# Patient Record
Sex: FEMALE | Race: WHITE | NOT HISPANIC OR LATINO
[De-identification: names, ages, dates, MRNs, and addresses within clinical notes are randomized per-mention and may not be internally consistent; named-entity substitution may affect disease eponyms.]

---

## 2019-08-27 ENCOUNTER — APPOINTMENT (OUTPATIENT)
Dept: INTERNAL MEDICINE | Facility: CLINIC | Age: 34
End: 2019-08-27
Payer: OTHER GOVERNMENT

## 2019-08-27 VITALS
BODY MASS INDEX: 28.34 KG/M2 | DIASTOLIC BLOOD PRESSURE: 80 MMHG | WEIGHT: 135 LBS | OXYGEN SATURATION: 96 % | SYSTOLIC BLOOD PRESSURE: 120 MMHG | HEART RATE: 77 BPM | HEIGHT: 58 IN | TEMPERATURE: 98.8 F

## 2019-08-27 DIAGNOSIS — N92.6 IRREGULAR MENSTRUATION, UNSPECIFIED: ICD-10-CM

## 2019-08-27 DIAGNOSIS — Z78.9 OTHER SPECIFIED HEALTH STATUS: ICD-10-CM

## 2019-08-27 DIAGNOSIS — Z86.59 PERSONAL HISTORY OF OTHER MENTAL AND BEHAVIORAL DISORDERS: ICD-10-CM

## 2019-08-27 DIAGNOSIS — Z02.82 ENCOUNTER FOR ADOPTION SERVICES: ICD-10-CM

## 2019-08-27 DIAGNOSIS — Z13.31 ENCOUNTER FOR SCREENING FOR DEPRESSION: ICD-10-CM

## 2019-08-27 LAB
BASOPHILS # BLD AUTO: 0.05 K/UL
BASOPHILS NFR BLD AUTO: 0.7 %
EOSINOPHIL # BLD AUTO: 0.11 K/UL
EOSINOPHIL NFR BLD AUTO: 1.6 %
HCT VFR BLD CALC: 44.7 %
HGB BLD-MCNC: 14.9 G/DL
IMM GRANULOCYTES NFR BLD AUTO: 0.1 %
LYMPHOCYTES # BLD AUTO: 2.42 K/UL
LYMPHOCYTES NFR BLD AUTO: 34.7 %
MAN DIFF?: NORMAL
MCHC RBC-ENTMCNC: 31 PG
MCHC RBC-ENTMCNC: 33.3 GM/DL
MCV RBC AUTO: 93.1 FL
MONOCYTES # BLD AUTO: 0.41 K/UL
MONOCYTES NFR BLD AUTO: 5.9 %
NEUTROPHILS # BLD AUTO: 3.97 K/UL
NEUTROPHILS NFR BLD AUTO: 57 %
PLATELET # BLD AUTO: 348 K/UL
RBC # BLD: 4.8 M/UL
RBC # FLD: 11.3 %
WBC # FLD AUTO: 6.97 K/UL

## 2019-08-27 PROCEDURE — 99385 PREV VISIT NEW AGE 18-39: CPT | Mod: 25

## 2019-08-27 PROCEDURE — G0444 DEPRESSION SCREEN ANNUAL: CPT | Mod: 59

## 2019-08-27 RX ORDER — IRON/IRON ASP GLY/FA/MV-MIN 38 125-25-1MG
TABLET ORAL
Refills: 0 | Status: ACTIVE | COMMUNITY

## 2019-08-27 RX ORDER — OMEGA-3/DHA/EPA/FISH OIL 300-1000MG
CAPSULE ORAL
Refills: 0 | Status: ACTIVE | COMMUNITY

## 2019-08-27 RX ORDER — MULTIVITAMIN
TABLET ORAL
Refills: 0 | Status: ACTIVE | COMMUNITY

## 2019-08-27 NOTE — PHYSICAL EXAM
[Well Nourished] : well nourished [No Acute Distress] : no acute distress [Well Developed] : well developed [Well-Appearing] : well-appearing [Normal Sclera/Conjunctiva] : normal sclera/conjunctiva [PERRL] : pupils equal round and reactive to light [Normal Oropharynx] : the oropharynx was normal [Normal Outer Ear/Nose] : the outer ears and nose were normal in appearance [Supple] : supple [No Respiratory Distress] : no respiratory distress  [Clear to Auscultation] : lungs were clear to auscultation bilaterally [No Accessory Muscle Use] : no accessory muscle use [Normal Rate] : normal rate  [Regular Rhythm] : with a regular rhythm [No Edema] : there was no peripheral edema [Normal S1, S2] : normal S1 and S2 [Soft] : abdomen soft [Non Tender] : non-tender [Non-distended] : non-distended [Normal Bowel Sounds] : normal bowel sounds [Normal Anterior Cervical Nodes] : no anterior cervical lymphadenopathy [No CVA Tenderness] : no CVA  tenderness [Coordination Grossly Intact] : coordination grossly intact [Grossly Normal Strength/Tone] : grossly normal strength/tone [No Focal Deficits] : no focal deficits [Normal Gait] : normal gait [Normal Affect] : the affect was normal [Alert and Oriented x3] : oriented to person, place, and time [Normal Insight/Judgement] : insight and judgment were intact

## 2019-08-27 NOTE — PLAN
[FreeTextEntry1] : Healthcare Maintenance\par -routine labs, unable to obtain in office she will go to [facility] lab to have these drawn\par -check STD, HIV screening as well\par -depression screen negative\par -UTD with pap smear 2018- normal results per patient\par \par Irregular periods\par -check pelvic and transvaginal US\par -GYN referral provided\par -will follow up with her husbands urologist about procedure for fertility and where she needs to schedule visit\par

## 2019-08-27 NOTE — HISTORY OF PRESENT ILLNESS
[FreeTextEntry1] : establish care, irregular periods [de-identified] : 35yo F presents to establish care, she complains of irregular periods over the past year. She has always had regular menstrual cycles until ealier this year she noted that they change in duration each time. She has had cycles that last 1 day, 3 days and then sometimes a whole week. They are heavier and more painful than prior as well. Aside from her menstrual cycle she denies any pelvic pain or vaginal spotting. Her  recently underwent sperm retrieval and they plan to undergo fertility treatments pending the results follow up with his urologist. She was last seen by a GYN summer of last year for routine pap smear at a clinic but does need to establish care with a local GYN.\par Denies any other complaints.\par She has been diagnosed with rheumatoid arthritis in the past, had a joint swelling flare up at one point and went to rheumatologist for testing. Since then she is not on any medications and denies any joint pain or swelling.\par She has also been diagnosed with bipolar II disorder in the past, she underwent extensive therapy for this but has not required treatment for years. She feels well and stable overall, on no medications.\par She has had hearing loss bilaterally since childhood, been to audiologist before and had hearing aids at one point but does not want to use them at this time. She feels well with how she is hearing currently and would like to hold off on using assistive devices at this time.

## 2019-08-27 NOTE — HEALTH RISK ASSESSMENT
[Yes] : Yes [No] : In the past 12 months have you used drugs other than those required for medical reasons? No [] :  [Smoke Detector] : smoke detector [Carbon Monoxide Detector] : carbon monoxide detector [Seat Belt] :  uses seat belt [1 or 2 (0 pts)] : 1 or 2 (0 points) [Monthly or less (1 pt)] : Monthly or less (1 point) [Never (0 pts)] : Never (0 points) [0] : 1) Little interest or pleasure doing things: Not at all (0) [Patient reported PAP Smear was normal] : Patient reported PAP Smear was normal [Hepatitis C test offered] : Hepatitis C test offered [HIV Test offered] : HIV Test offered [With Family] : lives with family [None] : None [# Of Children ___] : has [unfilled] children [Sexually Active] : sexually active [Fully functional (bathing, dressing, toileting, transferring, walking, feeding)] : Fully functional (bathing, dressing, toileting, transferring, walking, feeding) [Feels Safe at Home] : Feels safe at home [Fully functional (using the telephone, shopping, preparing meals, housekeeping, doing laundry, using] : Fully functional and needs no help or supervision to perform IADLs (using the telephone, shopping, preparing meals, housekeeping, doing laundry, using transportation, managing medications and managing finances) [Reports changes in hearing] : Reports changes in hearing [] : No [Audit-CScore] : 1 [[id number]] : 0 [Change in mental status noted] : No change in mental status noted [Language] : denies difficulty with language [Reports changes in vision] : Reports no changes in vision [Reports changes in dental health] : Reports no changes in dental health [PapSmearDate] : 07/18 [de-identified] : h/o hearing loss [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

## 2019-08-28 LAB
25(OH)D3 SERPL-MCNC: 21.1 NG/ML
ALBUMIN SERPL ELPH-MCNC: 5 G/DL
ALP BLD-CCNC: 77 U/L
ALT SERPL-CCNC: 36 U/L
ANION GAP SERPL CALC-SCNC: 17 MMOL/L
AST SERPL-CCNC: 21 U/L
BILIRUB SERPL-MCNC: 0.2 MG/DL
BUN SERPL-MCNC: 14 MG/DL
CALCIUM SERPL-MCNC: 10.2 MG/DL
CHLORIDE SERPL-SCNC: 99 MMOL/L
CHOLEST SERPL-MCNC: 372 MG/DL
CHOLEST/HDLC SERPL: 9.1 RATIO
CO2 SERPL-SCNC: 25 MMOL/L
CREAT SERPL-MCNC: 0.63 MG/DL
ESTIMATED AVERAGE GLUCOSE: 97 MG/DL
GLUCOSE SERPL-MCNC: 99 MG/DL
HAV IGM SER QL: NONREACTIVE
HBA1C MFR BLD HPLC: 5 %
HBV CORE IGM SER QL: NONREACTIVE
HBV SURFACE AG SER QL: NONREACTIVE
HCV AB SER QL: NONREACTIVE
HCV S/CO RATIO: 0.09 S/CO
HDLC SERPL-MCNC: 41 MG/DL
HIV1+2 AB SPEC QL IA.RAPID: NONREACTIVE
LDLC SERPL CALC-MCNC: 263 MG/DL
POTASSIUM SERPL-SCNC: 3.8 MMOL/L
PROT SERPL-MCNC: 7.9 G/DL
SODIUM SERPL-SCNC: 141 MMOL/L
T PALLIDUM AB SER QL IA: NEGATIVE
TRIGL SERPL-MCNC: 340 MG/DL
TSH SERPL-ACNC: 2.42 UIU/ML

## 2019-08-29 LAB
C TRACH RRNA SPEC QL NAA+PROBE: NOT DETECTED
N GONORRHOEA RRNA SPEC QL NAA+PROBE: NOT DETECTED
SOURCE AMPLIFICATION: NORMAL

## 2019-09-10 ENCOUNTER — TRANSCRIPTION ENCOUNTER (OUTPATIENT)
Age: 34
End: 2019-09-10

## 2019-09-22 ENCOUNTER — FORM ENCOUNTER (OUTPATIENT)
Age: 34
End: 2019-09-22

## 2019-09-23 ENCOUNTER — OUTPATIENT (OUTPATIENT)
Dept: OUTPATIENT SERVICES | Facility: HOSPITAL | Age: 34
LOS: 1 days | End: 2019-09-23
Payer: OTHER GOVERNMENT

## 2019-09-23 ENCOUNTER — APPOINTMENT (OUTPATIENT)
Dept: ULTRASOUND IMAGING | Facility: IMAGING CENTER | Age: 34
End: 2019-09-23
Payer: OTHER GOVERNMENT

## 2019-09-23 DIAGNOSIS — N92.6 IRREGULAR MENSTRUATION, UNSPECIFIED: ICD-10-CM

## 2019-09-23 PROCEDURE — 76856 US EXAM PELVIC COMPLETE: CPT | Mod: 26

## 2019-09-23 PROCEDURE — 76830 TRANSVAGINAL US NON-OB: CPT

## 2019-09-23 PROCEDURE — 76856 US EXAM PELVIC COMPLETE: CPT

## 2019-09-23 PROCEDURE — 76830 TRANSVAGINAL US NON-OB: CPT | Mod: 26

## 2021-03-16 ENCOUNTER — APPOINTMENT (OUTPATIENT)
Dept: OPHTHALMOLOGY | Facility: CLINIC | Age: 36
End: 2021-03-16

## 2021-10-01 ENCOUNTER — NON-APPOINTMENT (OUTPATIENT)
Age: 36
End: 2021-10-01

## 2021-10-01 ENCOUNTER — APPOINTMENT (OUTPATIENT)
Dept: INTERNAL MEDICINE | Facility: CLINIC | Age: 36
End: 2021-10-01
Payer: OTHER GOVERNMENT

## 2021-10-01 VITALS
HEIGHT: 58 IN | DIASTOLIC BLOOD PRESSURE: 85 MMHG | HEART RATE: 62 BPM | SYSTOLIC BLOOD PRESSURE: 136 MMHG | WEIGHT: 120 LBS | TEMPERATURE: 98.2 F | OXYGEN SATURATION: 98 % | BODY MASS INDEX: 25.19 KG/M2

## 2021-10-01 VITALS — DIASTOLIC BLOOD PRESSURE: 88 MMHG | SYSTOLIC BLOOD PRESSURE: 138 MMHG

## 2021-10-01 DIAGNOSIS — H91.90 UNSPECIFIED HEARING LOSS, UNSPECIFIED EAR: ICD-10-CM

## 2021-10-01 DIAGNOSIS — R05 COUGH: ICD-10-CM

## 2021-10-01 DIAGNOSIS — R80.9 PROTEINURIA, UNSPECIFIED: ICD-10-CM

## 2021-10-01 DIAGNOSIS — Z23 ENCOUNTER FOR IMMUNIZATION: ICD-10-CM

## 2021-10-01 DIAGNOSIS — M06.9 RHEUMATOID ARTHRITIS, UNSPECIFIED: ICD-10-CM

## 2021-10-01 DIAGNOSIS — U07.1 COVID-19: ICD-10-CM

## 2021-10-01 DIAGNOSIS — R10.9 UNSPECIFIED ABDOMINAL PAIN: ICD-10-CM

## 2021-10-01 DIAGNOSIS — Z00.00 ENCOUNTER FOR GENERAL ADULT MEDICAL EXAMINATION W/OUT ABNORMAL FINDINGS: ICD-10-CM

## 2021-10-01 DIAGNOSIS — K90.0 CELIAC DISEASE: ICD-10-CM

## 2021-10-01 PROCEDURE — 90471 IMMUNIZATION ADMIN: CPT

## 2021-10-01 PROCEDURE — 93000 ELECTROCARDIOGRAM COMPLETE: CPT

## 2021-10-01 PROCEDURE — 90715 TDAP VACCINE 7 YRS/> IM: CPT

## 2021-10-01 PROCEDURE — 36415 COLL VENOUS BLD VENIPUNCTURE: CPT

## 2021-10-01 PROCEDURE — 99395 PREV VISIT EST AGE 18-39: CPT | Mod: 25

## 2021-10-01 RX ORDER — FOLIC ACID 1 MG/1
1 TABLET ORAL
Refills: 0 | Status: DISCONTINUED | COMMUNITY
End: 2021-10-01

## 2021-10-01 RX ORDER — CRANBERRY FRUIT EXTRACT 200 MG
CAPSULE ORAL
Refills: 0 | Status: DISCONTINUED | COMMUNITY
End: 2021-10-01

## 2021-10-05 LAB
25(OH)D3 SERPL-MCNC: 56.8 NG/ML
ALBUMIN SERPL ELPH-MCNC: 4.9 G/DL
ALP BLD-CCNC: 68 U/L
ALT SERPL-CCNC: 19 U/L
ANION GAP SERPL CALC-SCNC: 13 MMOL/L
APPEARANCE: ABNORMAL
AST SERPL-CCNC: 17 U/L
BACTERIA: NEGATIVE
BASOPHILS # BLD AUTO: 0.05 K/UL
BASOPHILS NFR BLD AUTO: 0.8 %
BILIRUB SERPL-MCNC: 0.5 MG/DL
BILIRUBIN URINE: NEGATIVE
BLOOD URINE: NEGATIVE
BUN SERPL-MCNC: 11 MG/DL
CALCIUM SERPL-MCNC: 9.9 MG/DL
CHLORIDE SERPL-SCNC: 106 MMOL/L
CHOLEST SERPL-MCNC: 389 MG/DL
CO2 SERPL-SCNC: 22 MMOL/L
COLOR: YELLOW
CREAT SERPL-MCNC: 0.54 MG/DL
EOSINOPHIL # BLD AUTO: 0.07 K/UL
EOSINOPHIL NFR BLD AUTO: 1.2 %
ESTIMATED AVERAGE GLUCOSE: 103 MG/DL
GLUCOSE QUALITATIVE U: NEGATIVE
GLUCOSE SERPL-MCNC: 93 MG/DL
HBA1C MFR BLD HPLC: 5.2 %
HCT VFR BLD CALC: 41.5 %
HDLC SERPL-MCNC: 48 MG/DL
HGB BLD-MCNC: 14.1 G/DL
HYALINE CASTS: 1 /LPF
IMM GRANULOCYTES NFR BLD AUTO: 0.3 %
KETONES URINE: NEGATIVE
LDLC SERPL CALC-MCNC: 313 MG/DL
LEUKOCYTE ESTERASE URINE: NEGATIVE
LYMPHOCYTES # BLD AUTO: 1.9 K/UL
LYMPHOCYTES NFR BLD AUTO: 32.1 %
MAN DIFF?: NORMAL
MCHC RBC-ENTMCNC: 31.6 PG
MCHC RBC-ENTMCNC: 34 GM/DL
MCV RBC AUTO: 93 FL
MICROSCOPIC-UA: NORMAL
MONOCYTES # BLD AUTO: 0.31 K/UL
MONOCYTES NFR BLD AUTO: 5.2 %
NEUTROPHILS # BLD AUTO: 3.56 K/UL
NEUTROPHILS NFR BLD AUTO: 60.4 %
NITRITE URINE: NEGATIVE
NONHDLC SERPL-MCNC: 342 MG/DL
PH URINE: 5.5
PLATELET # BLD AUTO: 364 K/UL
POTASSIUM SERPL-SCNC: 4.2 MMOL/L
PROT SERPL-MCNC: 7.8 G/DL
PROTEIN URINE: ABNORMAL
RBC # BLD: 4.46 M/UL
RBC # FLD: 11.3 %
RED BLOOD CELLS URINE: 3 /HPF
SODIUM SERPL-SCNC: 141 MMOL/L
SPECIFIC GRAVITY URINE: 1.04
SQUAMOUS EPITHELIAL CELLS: 8 /HPF
TRIGL SERPL-MCNC: 144 MG/DL
TSH SERPL-ACNC: 2.35 UIU/ML
URIC ACID CRYSTALS: ABNORMAL
UROBILINOGEN URINE: NORMAL
WBC # FLD AUTO: 5.91 K/UL
WHITE BLOOD CELLS URINE: 3 /HPF

## 2021-11-19 ENCOUNTER — NON-APPOINTMENT (OUTPATIENT)
Age: 36
End: 2021-11-19

## 2021-11-19 ENCOUNTER — APPOINTMENT (OUTPATIENT)
Dept: ULTRASOUND IMAGING | Facility: CLINIC | Age: 36
End: 2021-11-19
Payer: OTHER GOVERNMENT

## 2021-11-19 ENCOUNTER — APPOINTMENT (OUTPATIENT)
Dept: CARDIOLOGY | Facility: CLINIC | Age: 36
End: 2021-11-19
Payer: OTHER GOVERNMENT

## 2021-11-19 ENCOUNTER — APPOINTMENT (OUTPATIENT)
Dept: CARDIOLOGY | Facility: CLINIC | Age: 36
End: 2021-11-19

## 2021-11-19 VITALS — SYSTOLIC BLOOD PRESSURE: 144 MMHG | DIASTOLIC BLOOD PRESSURE: 80 MMHG | HEART RATE: 66 BPM

## 2021-11-19 VITALS
SYSTOLIC BLOOD PRESSURE: 148 MMHG | RESPIRATION RATE: 17 BRPM | HEART RATE: 63 BPM | DIASTOLIC BLOOD PRESSURE: 91 MMHG | HEIGHT: 58 IN | BODY MASS INDEX: 25.61 KG/M2 | OXYGEN SATURATION: 100 % | WEIGHT: 122 LBS

## 2021-11-19 PROCEDURE — 76775 US EXAM ABDO BACK WALL LIM: CPT

## 2021-11-19 PROCEDURE — 36415 COLL VENOUS BLD VENIPUNCTURE: CPT

## 2021-11-19 PROCEDURE — 99244 OFF/OP CNSLTJ NEW/EST MOD 40: CPT

## 2021-11-19 PROCEDURE — 93000 ELECTROCARDIOGRAM COMPLETE: CPT

## 2021-11-22 ENCOUNTER — APPOINTMENT (OUTPATIENT)
Dept: CARDIOLOGY | Facility: CLINIC | Age: 36
End: 2021-11-22
Payer: OTHER GOVERNMENT

## 2021-11-22 LAB
CHOLEST SERPL-MCNC: 253 MG/DL
CK SERPL-CCNC: 83 U/L
CRP SERPL HS-MCNC: 0.29 MG/L
HCYS SERPL-MCNC: 8.2 UMOL/L
HDL-C: 56 MG/DL
HDL-P (TOTAL): 33.8 UMOL/L
HDLC SERPL-MCNC: 56 MG/DL
LDL SIZE: 21.5 NM
LDL-C: 163 MG/DL
LDL-P: 2329 NMOL/L
LDLC SERPL CALC-MCNC: 160 MG/DL
LP-IR SCORE: 66
NMR CHOLESTEROL, TOTAL: 254 MG/DL
NONHDLC SERPL-MCNC: 197 MG/DL
SMALL LDL-P: 958 NMOL/L
TRIGL SERPL-MCNC: 186 MG/DL
TRIGL SERPL-MCNC: 191 MG/DL

## 2021-11-22 PROCEDURE — 93015 CV STRESS TEST SUPVJ I&R: CPT

## 2021-11-22 PROCEDURE — 99212 OFFICE O/P EST SF 10 MIN: CPT | Mod: 25

## 2021-11-22 NOTE — PHYSICAL EXAM
[Normal] : well developed, well nourished, no acute distress [Well Developed] : well developed [Well Nourished] : well nourished [No Acute Distress] : no acute distress [Normal Conjunctiva] : normal conjunctiva [Normal Gait] : normal gait [No Skin Lesions] : no skin lesions [Moves all extremities] : moves all extremities [No Focal Deficits] : no focal deficits [Normal Speech] : normal speech [Alert and Oriented] : alert and oriented [Normal memory] : normal memory

## 2021-11-23 NOTE — REASON FOR VISIT
[FreeTextEntry1] : 36-year-old premenopausal woman with atypical chest pain but with presumably severe familial hyperlipidemia with LDL over 300.

## 2021-11-23 NOTE — REVIEW OF SYSTEMS
[Dyspnea on exertion] : dyspnea during exertion [Chest Discomfort] : chest discomfort [Syncope] : syncope [Negative] : Heme/Lymph [Weight Gain (___ Lbs)] : no recent weight gain [Feeling Fatigued] : not feeling fatigued [Weight Loss (___ Lbs)] : no recent weight loss [Lower Ext Edema] : no extremity edema [Palpitations] : no palpitations [Orthopnea] : no orthopnea [PND] : no PND [FreeTextEntry5] : see HPI [FreeTextEntry7] : Celiac disease [de-identified] : Bipolar disorder and borderline personality disorder both under good control without medication

## 2021-11-23 NOTE — CARDIOLOGY SUMMARY
[de-identified] : November 22, 2021.  Patient exercised for 10 minutes and 35 seconds to a heart rate of 168 and a blood pressure of 162/80.  She started out with mild chest discomfort on the right side which increased to 5 out of 10 during exercise and persisted for 5 minutes recovery.  She also have briefly had left-sided chest pain that was sharp and pinching-like during the exercise and then in recovery she said she felt lightheaded.  Blood pressure in recovery was fine.  Absolutely no ST changes and no arrhythmias.

## 2021-11-23 NOTE — HISTORY OF PRESENT ILLNESS
[FreeTextEntry1] : November 19, 2021.  Patient has no known cardiac history.  She thinks she may have had a murmur when she was young but was never sent to a pediatric cardiologist and she was able to compete in track and field without any issues.  She has a lipid profile from 2019 that had a cholesterol of 372, , triglycerides 372, and HDL of 41.  She has been mostly a vegan since then.  Unfortunately there is no known family history as she was adopted.  She does not smoke other than a few cigarettes years ago when she was younger.  No known diabetes or hypertension.  On September 29 she awoke with severe pressure across her chest in the middle of the night more right-sided than left and going a little bit to the right flank.  It lasted about an hour and then resolved.  She did not describe any associated features or radiation and that exact symptom has not recurred.  However she has had exertional shortness of breath which she dates back to August when she and her  had Covid.  She was treated with monoclonal antibodies and seemed to do okay. (Not yet vaccinated because she had to wait 90 days from the monoclonal antibodies and is scheduled to get her first vaccine next week.)  However she says she gets chest pressure on and off ever since the episode and maybe even before. It can last all day and is different than the severe episode that night.  At first she said there was no difference with exertion but by the end she felt she was more likely to have chest pressure and shortness of breath with exertion.  She had a recent repeat lipid profile and now her cholesterol was 389 with an LDL of 313 again despite being on a vegan diet.  Her recent hemoglobin A1c was 5.2.  She has a long history of manic depression and borderline personality disorder but after intensive therapy these have been under good control and she is on no medications for them.  She has had a couple of syncopal episodes that sound like either related to exhaustion and dehydration or possibly vasovagal.  As mentioned she is still premenopausal.  Her second pregnancy was complicated by low blood pressure but she has never had preeclampsia or hypertension related to her pregnancy.  She does get lightheaded if she has not eaten in a while.  Her baseline EKG is sinus bradycardia at 55 and otherwise totally normal.\par 11/22/2021.  Patient returns for brief follow-up along with her stress test.  Her cholesterol profile had already improved significantly on just 10 mg of rosuvastatin with a cholesterol of 253, HDL 56, .  The cardiac C-reactive protein was negative at 0.29.  Homocystine was normal at 8.2.  Her NMR lipid profile however was very abnormal with definitely small dense lipoproteins at 958 which is very high and her insulin resistance was very high at 66 although the average LDL size of 21.5 is favorable.  On the stress test she was able to exercise for 10-1/2 minutes to a heart rate of 168.  She started out with some resting atypical right-sided chest pressure that was 3 out of 10 and increased to 5 out of 10.  She developed some left-sided sharp pinching pain during the test and then in recovery felt lightheaded.  (She had mentioned that over the weekend she went running and felt lightheaded and had the chest pain and had to stop).  There were absolutely no ST changes whatsoever during the test or in recovery and no APCs or VPCs.

## 2022-01-25 ENCOUNTER — APPOINTMENT (OUTPATIENT)
Dept: CARDIOLOGY | Facility: CLINIC | Age: 37
End: 2022-01-25
Payer: OTHER GOVERNMENT

## 2022-01-25 VITALS
OXYGEN SATURATION: 97 % | WEIGHT: 126 LBS | HEIGHT: 58 IN | DIASTOLIC BLOOD PRESSURE: 68 MMHG | SYSTOLIC BLOOD PRESSURE: 119 MMHG | HEART RATE: 70 BPM | BODY MASS INDEX: 26.45 KG/M2

## 2022-01-25 DIAGNOSIS — R07.9 CHEST PAIN, UNSPECIFIED: ICD-10-CM

## 2022-01-25 PROCEDURE — 36415 COLL VENOUS BLD VENIPUNCTURE: CPT

## 2022-01-25 PROCEDURE — 99214 OFFICE O/P EST MOD 30 MIN: CPT

## 2022-01-27 LAB
ALBUMIN SERPL ELPH-MCNC: 5.2 G/DL
ALP BLD-CCNC: 61 U/L
ALT SERPL-CCNC: 27 U/L
ANION GAP SERPL CALC-SCNC: 16 MMOL/L
AST SERPL-CCNC: 21 U/L
BASOPHILS # BLD AUTO: 0.04 K/UL
BASOPHILS NFR BLD AUTO: 0.5 %
BILIRUB SERPL-MCNC: 0.5 MG/DL
BUN SERPL-MCNC: 10 MG/DL
CALCIUM SERPL-MCNC: 10.1 MG/DL
CHLORIDE SERPL-SCNC: 104 MMOL/L
CHOLEST SERPL-MCNC: 219 MG/DL
CK SERPL-CCNC: 81 U/L
CO2 SERPL-SCNC: 18 MMOL/L
CREAT SERPL-MCNC: 0.56 MG/DL
EOSINOPHIL # BLD AUTO: 0.03 K/UL
EOSINOPHIL NFR BLD AUTO: 0.4 %
GLUCOSE SERPL-MCNC: 76 MG/DL
HCT VFR BLD CALC: 42.3 %
HDLC SERPL-MCNC: 57 MG/DL
HGB BLD-MCNC: 14.3 G/DL
IMM GRANULOCYTES NFR BLD AUTO: 0.1 %
LDLC SERPL CALC-MCNC: 129 MG/DL
LYMPHOCYTES # BLD AUTO: 2.38 K/UL
LYMPHOCYTES NFR BLD AUTO: 32.7 %
MAN DIFF?: NORMAL
MCHC RBC-ENTMCNC: 31.4 PG
MCHC RBC-ENTMCNC: 33.8 GM/DL
MCV RBC AUTO: 93 FL
MONOCYTES # BLD AUTO: 0.3 K/UL
MONOCYTES NFR BLD AUTO: 4.1 %
NEUTROPHILS # BLD AUTO: 4.52 K/UL
NEUTROPHILS NFR BLD AUTO: 62.2 %
NONHDLC SERPL-MCNC: 162 MG/DL
PLATELET # BLD AUTO: 308 K/UL
POTASSIUM SERPL-SCNC: 4.1 MMOL/L
PROT SERPL-MCNC: 7.6 G/DL
RBC # BLD: 4.55 M/UL
RBC # FLD: 11.5 %
SODIUM SERPL-SCNC: 139 MMOL/L
TRIGL SERPL-MCNC: 163 MG/DL
WBC # FLD AUTO: 7.28 K/UL

## 2022-01-28 ENCOUNTER — NON-APPOINTMENT (OUTPATIENT)
Age: 37
End: 2022-01-28

## 2022-01-31 ENCOUNTER — APPOINTMENT (OUTPATIENT)
Dept: CARDIOLOGY | Facility: CLINIC | Age: 37
End: 2022-01-31

## 2022-01-31 ENCOUNTER — APPOINTMENT (OUTPATIENT)
Dept: CARDIOLOGY | Facility: CLINIC | Age: 37
End: 2022-01-31
Payer: OTHER GOVERNMENT

## 2022-01-31 PROCEDURE — 93306 TTE W/DOPPLER COMPLETE: CPT

## 2022-02-01 NOTE — REVIEW OF SYSTEMS
[Weight Gain (___ Lbs)] : [unfilled] ~Ulb weight gain [Dyspnea on exertion] : dyspnea during exertion [Chest Discomfort] : chest discomfort [Syncope] : syncope [Negative] : Heme/Lymph [Feeling Fatigued] : not feeling fatigued [Weight Loss (___ Lbs)] : no recent weight loss [Lower Ext Edema] : no extremity edema [Palpitations] : no palpitations [Orthopnea] : no orthopnea [PND] : no PND [FreeTextEntry5] : see HPI [FreeTextEntry7] : Celiac disease [de-identified] : Bipolar disorder and borderline personality disorder both under good control without medication

## 2022-02-01 NOTE — HISTORY OF PRESENT ILLNESS
Hide Include Location In Plan Question?: No [FreeTextEntry1] : November 19, 2021.  Patient has no known cardiac history.  She thinks she may have had a murmur when she was young but was never sent to a pediatric cardiologist and she was able to compete in track and field without any issues.  She has a lipid profile from 2019 that had a cholesterol of 372, , triglycerides 372, and HDL of 41.  She has been mostly a vegan since then.  Unfortunately there is no known family history as she was adopted.  She does not smoke other than a few cigarettes years ago when she was younger.  No known diabetes or hypertension.  On September 29 she awoke with severe pressure across her chest in the middle of the night more right-sided than left and going a little bit to the right flank.  It lasted about an hour and then resolved.  She did not describe any associated features or radiation and that exact symptom has not recurred.  However she has had exertional shortness of breath which she dates back to August when she and her  had Covid.  She was treated with monoclonal antibodies and seemed to do okay. (Not yet vaccinated because she had to wait 90 days from the monoclonal antibodies and is scheduled to get her first vaccine next week.)  However she says she gets chest pressure on and off ever since the episode and maybe even before. It can last all day and is different than the severe episode that night.  At first she said there was no difference with exertion but by the end she felt she was more likely to have chest pressure and shortness of breath with exertion.  She had a recent repeat lipid profile and now her cholesterol was 389 with an LDL of 313 again despite being on a vegan diet.  Her recent hemoglobin A1c was 5.2.  She has a long history of manic depression and borderline personality disorder but after intensive therapy these have been under good control and she is on no medications for them.  She has had a couple of syncopal episodes that sound like either related to exhaustion and dehydration or possibly vasovagal.  As mentioned she is still premenopausal.  Her second pregnancy was complicated by low blood pressure but she has never had preeclampsia or hypertension related to her pregnancy.  She does get lightheaded if she has not eaten in a while.  Her baseline EKG is sinus bradycardia at 55 and otherwise totally normal.\par 11/22/2021.  Patient returns for brief follow-up along with her stress test.  Her cholesterol profile had already improved significantly on just 10 mg of rosuvastatin with a cholesterol of 253, HDL 56, .  The cardiac C-reactive protein was negative at 0.29.  Homocystine was normal at 8.2.  Her NMR lipid profile however was very abnormal with definitely small dense lipoproteins at 958 which is very high and her insulin resistance was very high at 66 although the average LDL size of 21.5 is favorable.  On the stress test she was able to exercise for 10-1/2 minutes to a heart rate of 168.  She started out with some resting atypical right-sided chest pressure that was 3 out of 10 and increased to 5 out of 10.  She developed some left-sided sharp pinching pain during the test and then in recovery felt lightheaded.  (She had mentioned that over the weekend she went running and felt lightheaded and had the chest pain and had to stop).  There were absolutely no ST changes whatsoever during the test or in recovery and no APCs or VPCs.\par 1/25/2022.  Patient here in follow-up.  Now on rosuvastatin 20 mg.  Still has multiple symptoms none of which I think are related to the rosuvastatin.  Her  mentioned that she forgets a lot of things but I explained that this could not be from the statin such a short period of time.  Blood pressure excellent and improved.  Exam unremarkable.\par January 28, 2022. was here for echo today so came to talk about her with me.  He wanted to know if she would be diagnosed with angina and whether she needs an echocardiogram.  Still having chest pain and is exhausted which she thinks is ever since the rosuvastatin was increased.  I did explain that to have angina even with normal coronaries 1 has to first demonstrate ischemia on stress testing which she has not yet but we will schedule an echo and follow-up. \par  January 31, 2022.Patient came for echocardiogram.  Mild degree of MVP of posterior leaflet with mild MR.  Normal aortic valve.  Normal left atrium.  Normal LV size and function with LVEF 73%.  Normal RV size and function with mild TR.  RVSP 33.  Normal pericardium with no effusion.  Spoke with patient.  Reassured her that the degree of mitral regurgitation now is not causing any symptoms or problems and might not in the future but we will follow the echo every few years. \par \par  Detail Level: Zone

## 2022-02-01 NOTE — CARDIOLOGY SUMMARY
[de-identified] : November 22, 2021.  Patient exercised for 10 minutes and 35 seconds to a heart rate of 168 and a blood pressure of 162/80.  She started out with mild chest discomfort on the right side which increased to 5 out of 10 during exercise and persisted for 5 minutes recovery.  She also have briefly had left-sided chest pain that was sharp and pinching-like during the exercise and then in recovery she said she felt lightheaded.  Blood pressure in recovery was fine.  Absolutely no ST changes and no arrhythmias. [de-identified] : January 31, 2022.  Mild mitral valve prolapse involving posterior leaflet with mild MR.  Normal trileaflet aortic valve with normal opening and no AI.  Aorta 3.7 cm at sinus of Valsalva, ascending aorta 2.9 cm.  Normal left atrium.  Normal LV size and function with LVEF 73%.  Normal diastolic function.  Normal RV size and function with mild TR.  RVSP 33.  Normal pericardium with no effusion.

## 2022-02-01 NOTE — PHYSICAL EXAM
[Normal] : well developed, well nourished, no acute distress [Well Developed] : well developed [Well Nourished] : well nourished [No Acute Distress] : no acute distress [Normal Conjunctiva] : normal conjunctiva [Normal Venous Pressure] : normal venous pressure [No Carotid Bruit] : no carotid bruit [Normal S1, S2] : normal S1, S2 [No Murmur] : no murmur [No Rub] : no rub [No Gallop] : no gallop [Clear Lung Fields] : clear lung fields [Good Air Entry] : good air entry [No Respiratory Distress] : no respiratory distress  [Soft] : abdomen soft [Non Tender] : non-tender [No Masses/organomegaly] : no masses/organomegaly [Normal Bowel Sounds] : normal bowel sounds [Normal Gait] : normal gait [No Edema] : no edema [No Cyanosis] : no cyanosis [No Clubbing] : no clubbing [No Varicosities] : no varicosities [Normal Radial B/L] : normal radial B/L [Normal PT B/L] : normal PT B/L [Normal DP B/L] : normal DP B/L [No Rash] : no rash [No Skin Lesions] : no skin lesions [Moves all extremities] : moves all extremities [No Focal Deficits] : no focal deficits [Normal Speech] : normal speech [Alert and Oriented] : alert and oriented [Normal memory] : normal memory [de-identified] : No gallops rubs murmurs or clicks even in the left lateral decubitus position [de-identified] : No pulse deficits and no blood pressure differential in both arms [de-identified] : Multiple tattoos both arms [de-identified] : Not overly anxious, not manic not depressed

## 2022-04-12 ENCOUNTER — APPOINTMENT (OUTPATIENT)
Dept: PEDIATRIC MEDICAL GENETICS | Facility: CLINIC | Age: 37
End: 2022-04-12

## 2022-04-12 PROCEDURE — ZZZZZ: CPT

## 2022-06-07 ENCOUNTER — APPOINTMENT (OUTPATIENT)
Dept: PEDIATRIC MEDICAL GENETICS | Facility: CLINIC | Age: 37
End: 2022-06-07

## 2022-06-21 ENCOUNTER — NON-APPOINTMENT (OUTPATIENT)
Age: 37
End: 2022-06-21

## 2022-06-21 ENCOUNTER — APPOINTMENT (OUTPATIENT)
Dept: PEDIATRIC MEDICAL GENETICS | Facility: CLINIC | Age: 37
End: 2022-06-21

## 2022-06-21 PROCEDURE — 96040: CPT | Mod: 95

## 2022-08-09 ENCOUNTER — APPOINTMENT (OUTPATIENT)
Dept: CARDIOLOGY | Facility: CLINIC | Age: 37
End: 2022-08-09

## 2022-08-09 ENCOUNTER — NON-APPOINTMENT (OUTPATIENT)
Age: 37
End: 2022-08-09

## 2022-08-09 VITALS
DIASTOLIC BLOOD PRESSURE: 96 MMHG | HEART RATE: 72 BPM | RESPIRATION RATE: 17 BRPM | WEIGHT: 128 LBS | OXYGEN SATURATION: 98 % | BODY MASS INDEX: 26.87 KG/M2 | HEIGHT: 58 IN | SYSTOLIC BLOOD PRESSURE: 148 MMHG

## 2022-08-09 VITALS — HEART RATE: 70 BPM | SYSTOLIC BLOOD PRESSURE: 142 MMHG | DIASTOLIC BLOOD PRESSURE: 90 MMHG

## 2022-08-09 PROCEDURE — 93000 ELECTROCARDIOGRAM COMPLETE: CPT

## 2022-08-09 PROCEDURE — 36415 COLL VENOUS BLD VENIPUNCTURE: CPT

## 2022-08-09 PROCEDURE — 99214 OFFICE O/P EST MOD 30 MIN: CPT

## 2022-08-09 NOTE — REVIEW OF SYSTEMS
[Weight Gain (___ Lbs)] : [unfilled] ~Ulb weight gain [Feeling Fatigued] : not feeling fatigued [Weight Loss (___ Lbs)] : no recent weight loss [Dyspnea on exertion] : not dyspnea during exertion [Chest Discomfort] : no chest discomfort [Lower Ext Edema] : no extremity edema [Palpitations] : no palpitations [Orthopnea] : no orthopnea [PND] : no PND [Syncope] : no syncope [Negative] : Heme/Lymph [FreeTextEntry5] : see HPI [FreeTextEntry7] : Celiac disease [de-identified] : Bipolar disorder and borderline personality disorder both under good control without medication

## 2022-08-09 NOTE — CARDIOLOGY SUMMARY
[de-identified] : November 22, 2021.  Patient exercised for 10 minutes and 35 seconds to a heart rate of 168 and a blood pressure of 162/80.  She started out with mild chest discomfort on the right side which increased to 5 out of 10 during exercise and persisted for 5 minutes recovery.  She also have briefly had left-sided chest pain that was sharp and pinching-like during the exercise and then in recovery she said she felt lightheaded.  Blood pressure in recovery was fine.  Absolutely no ST changes and no arrhythmias. [de-identified] : January 31, 2022.  Mild mitral valve prolapse involving posterior leaflet with mild MR.  Normal trileaflet aortic valve with normal opening and no AI.  Aorta 3.7 cm at sinus of Valsalva, ascending aorta 2.9 cm.  Normal left atrium.  Normal LV size and function with LVEF 73%.  Normal diastolic function.  Normal RV size and function with mild TR.  RVSP 33.  Normal pericardium with no effusion.

## 2022-08-09 NOTE — REASON FOR VISIT
[FreeTextEntry1] : 37-year-old premenopausal woman with atypical chest pain but with presumably severe familial hyperlipidemia with LDL over 300.

## 2022-08-09 NOTE — PHYSICAL EXAM
[Normal] : well developed, well nourished, no acute distress [Well Developed] : well developed [Well Nourished] : well nourished [No Acute Distress] : no acute distress [Normal Conjunctiva] : normal conjunctiva [Normal Venous Pressure] : normal venous pressure [No Carotid Bruit] : no carotid bruit [Normal S1, S2] : normal S1, S2 [No Murmur] : no murmur [No Rub] : no rub [No Gallop] : no gallop [Clear Lung Fields] : clear lung fields [Good Air Entry] : good air entry [No Respiratory Distress] : no respiratory distress  [Soft] : abdomen soft [Non Tender] : non-tender [No Masses/organomegaly] : no masses/organomegaly [Normal Bowel Sounds] : normal bowel sounds [Normal Gait] : normal gait [No Edema] : no edema [No Cyanosis] : no cyanosis [No Clubbing] : no clubbing [No Varicosities] : no varicosities [Normal Radial B/L] : normal radial B/L [Normal PT B/L] : normal PT B/L [Normal DP B/L] : normal DP B/L [No Rash] : no rash [No Skin Lesions] : no skin lesions [Moves all extremities] : moves all extremities [No Focal Deficits] : no focal deficits [Normal Speech] : normal speech [Alert and Oriented] : alert and oriented [Normal memory] : normal memory [de-identified] : No gallops rubs murmurs or clicks even in the left lateral decubitus position [de-identified] : No pulse deficits and no blood pressure differential in both arms [de-identified] : Multiple tattoos both arms [de-identified] : Not overly anxious, not manic not depressed

## 2022-08-09 NOTE — HISTORY OF PRESENT ILLNESS
[FreeTextEntry1] : November 19, 2021.  Patient has no known cardiac history.  She thinks she may have had a murmur when she was young but was never sent to a pediatric cardiologist and she was able to compete in track and field without any issues.  She has a lipid profile from 2019 that had a cholesterol of 372, , triglycerides 372, and HDL of 41.  She has been mostly a vegan since then.  Unfortunately there is no known family history as she was adopted.  She does not smoke other than a few cigarettes years ago when she was younger.  No known diabetes or hypertension.  On September 29 she awoke with severe pressure across her chest in the middle of the night more right-sided than left and going a little bit to the right flank.  It lasted about an hour and then resolved.  She did not describe any associated features or radiation and that exact symptom has not recurred.  However she has had exertional shortness of breath which she dates back to August when she and her  had Covid.  She was treated with monoclonal antibodies and seemed to do okay. (Not yet vaccinated because she had to wait 90 days from the monoclonal antibodies and is scheduled to get her first vaccine next week.)  However she says she gets chest pressure on and off ever since the episode and maybe even before. It can last all day and is different than the severe episode that night.  At first she said there was no difference with exertion but by the end she felt she was more likely to have chest pressure and shortness of breath with exertion.  She had a recent repeat lipid profile and now her cholesterol was 389 with an LDL of 313 again despite being on a vegan diet.  Her recent hemoglobin A1c was 5.2.  She has a long history of manic depression and borderline personality disorder but after intensive therapy these have been under good control and she is on no medications for them.  She has had a couple of syncopal episodes that sound like either related to exhaustion and dehydration or possibly vasovagal.  As mentioned she is still premenopausal.  Her second pregnancy was complicated by low blood pressure but she has never had preeclampsia or hypertension related to her pregnancy.  She does get lightheaded if she has not eaten in a while.  Her baseline EKG is sinus bradycardia at 55 and otherwise totally normal.\par 11/22/2021.  Patient returns for brief follow-up along with her stress test.  Her cholesterol profile had already improved significantly on just 10 mg of rosuvastatin with a cholesterol of 253, HDL 56, .  The cardiac C-reactive protein was negative at 0.29.  Homocystine was normal at 8.2.  Her NMR lipid profile however was very abnormal with definitely small dense lipoproteins at 958 which is very high and her insulin resistance was very high at 66 although the average LDL size of 21.5 is favorable.  On the stress test she was able to exercise for 10-1/2 minutes to a heart rate of 168.  She started out with some resting atypical right-sided chest pressure that was 3 out of 10 and increased to 5 out of 10.  She developed some left-sided sharp pinching pain during the test and then in recovery felt lightheaded.  (She had mentioned that over the weekend she went running and felt lightheaded and had the chest pain and had to stop).  There were absolutely no ST changes whatsoever during the test or in recovery and no APCs or VPCs.\par 1/25/2022.  Patient here in follow-up.  Now on rosuvastatin 20 mg.  Still has multiple symptoms none of which I think are related to the rosuvastatin.  Her  mentioned that she forgets a lot of things but I explained that this could not be from the statin such a short period of time.  Blood pressure excellent and improved.  Exam unremarkable.\par January 28, 2022. was here for echo today so came to talk about her with me.  He wanted to know if she would be diagnosed with angina and whether she needs an echocardiogram.  Still having chest pain and is exhausted which she thinks is ever since the rosuvastatin was increased.  I did explain that to have angina even with normal coronaries 1 has to first demonstrate ischemia on stress testing which she has not yet but we will schedule an echo and follow-up. \par  January 31, 2022.Patient came for echocardiogram.  Mild degree of MVP of posterior leaflet with mild MR.  Normal aortic valve.  Normal left atrium.  Normal LV size and function with LVEF 73%.  Normal RV size and function with mild TR.  RVSP 33.  Normal pericardium with no effusion.  Spoke with patient.  Reassured her that the degree of mitral regurgitation now is not causing any symptoms or problems and might not in the future but we will follow the echo every few years. \par August 9, 2022.  Patient returns in follow-up.  EKG is sinus rhythm at 60 and otherwise unremarkable.  She did see the Cardiologenomics program at [facility] for hereditary cardiac predisposition back on April 12 and again on June 21. (Actually recommended by her daughters gastroenterologist as her 14-year-old daughter also has the same pathogenic variant in the LDLR gene. 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  The patient's daughter is on atorvastatin and the patient herself is doing well on rosuvastatin 20 mg.  No muscle aches, no chest pain or shortness of breath etc.  Her blood pressure seems to be running high and there was only 1 normal reading in the computer which seems to be the outlier.  She does have a blood pressure cuff at home and she will begin checking blood pressure a few times a week.\par

## 2022-08-10 DIAGNOSIS — E78.5 HYPERLIPIDEMIA, UNSPECIFIED: ICD-10-CM

## 2022-08-10 LAB
ALBUMIN SERPL ELPH-MCNC: 5.1 G/DL
ALP BLD-CCNC: 102 U/L
ALT SERPL-CCNC: 66 U/L
ANION GAP SERPL CALC-SCNC: 14 MMOL/L
AST SERPL-CCNC: 33 U/L
BASOPHILS # BLD AUTO: 0.04 K/UL
BASOPHILS NFR BLD AUTO: 0.7 %
BILIRUB SERPL-MCNC: 0.2 MG/DL
BUN SERPL-MCNC: 13 MG/DL
CALCIUM SERPL-MCNC: 9.7 MG/DL
CHLORIDE SERPL-SCNC: 104 MMOL/L
CHOLEST SERPL-MCNC: 334 MG/DL
CK SERPL-CCNC: 68 U/L
CO2 SERPL-SCNC: 22 MMOL/L
CREAT SERPL-MCNC: 0.65 MG/DL
EGFR: 116 ML/MIN/1.73M2
EOSINOPHIL # BLD AUTO: 0.07 K/UL
EOSINOPHIL NFR BLD AUTO: 1.2 %
GLUCOSE SERPL-MCNC: 83 MG/DL
HCT VFR BLD CALC: 42 %
HDLC SERPL-MCNC: 52 MG/DL
HGB BLD-MCNC: 14.6 G/DL
IMM GRANULOCYTES NFR BLD AUTO: 0.2 %
LDLC SERPL CALC-MCNC: 213 MG/DL
LYMPHOCYTES # BLD AUTO: 1.71 K/UL
LYMPHOCYTES NFR BLD AUTO: 30.2 %
MAN DIFF?: NORMAL
MCHC RBC-ENTMCNC: 32.5 PG
MCHC RBC-ENTMCNC: 34.8 GM/DL
MCV RBC AUTO: 93.5 FL
MONOCYTES # BLD AUTO: 0.37 K/UL
MONOCYTES NFR BLD AUTO: 6.5 %
NEUTROPHILS # BLD AUTO: 3.47 K/UL
NEUTROPHILS NFR BLD AUTO: 61.2 %
NONHDLC SERPL-MCNC: 281 MG/DL
PLATELET # BLD AUTO: 351 K/UL
POTASSIUM SERPL-SCNC: 4.1 MMOL/L
PROT SERPL-MCNC: 7.5 G/DL
RBC # BLD: 4.49 M/UL
RBC # FLD: 10.8 %
SODIUM SERPL-SCNC: 140 MMOL/L
TRIGL SERPL-MCNC: 339 MG/DL
TSH SERPL-ACNC: 2.33 UIU/ML
WBC # FLD AUTO: 5.67 K/UL

## 2022-09-13 ENCOUNTER — NON-APPOINTMENT (OUTPATIENT)
Age: 37
End: 2022-09-13

## 2022-09-13 ENCOUNTER — APPOINTMENT (OUTPATIENT)
Dept: OPHTHALMOLOGY | Facility: CLINIC | Age: 37
End: 2022-09-13

## 2022-09-13 PROCEDURE — 92310B: CUSTOM

## 2022-09-13 PROCEDURE — 92004 COMPRE OPH EXAM NEW PT 1/>: CPT

## 2022-11-15 ENCOUNTER — NON-APPOINTMENT (OUTPATIENT)
Age: 37
End: 2022-11-15

## 2022-11-15 ENCOUNTER — APPOINTMENT (OUTPATIENT)
Dept: CARDIOLOGY | Facility: CLINIC | Age: 37
End: 2022-11-15

## 2022-11-15 VITALS
DIASTOLIC BLOOD PRESSURE: 91 MMHG | WEIGHT: 132 LBS | SYSTOLIC BLOOD PRESSURE: 139 MMHG | OXYGEN SATURATION: 95 % | HEIGHT: 58 IN | HEART RATE: 64 BPM | BODY MASS INDEX: 27.71 KG/M2

## 2022-11-15 VITALS — HEART RATE: 64 BPM | SYSTOLIC BLOOD PRESSURE: 128 MMHG | DIASTOLIC BLOOD PRESSURE: 78 MMHG

## 2022-11-15 DIAGNOSIS — R00.1 BRADYCARDIA, UNSPECIFIED: ICD-10-CM

## 2022-11-15 DIAGNOSIS — R03.0 ELEVATED BLOOD-PRESSURE READING, W/OUT DIAGNOSIS OF HYPERTENSION: ICD-10-CM

## 2022-11-15 DIAGNOSIS — R73.09 OTHER ABNORMAL GLUCOSE: ICD-10-CM

## 2022-11-15 DIAGNOSIS — E78.49 OTHER HYPERLIPIDEMIA: ICD-10-CM

## 2022-11-15 PROCEDURE — 36415 COLL VENOUS BLD VENIPUNCTURE: CPT

## 2022-11-15 PROCEDURE — 99214 OFFICE O/P EST MOD 30 MIN: CPT

## 2022-11-15 PROCEDURE — 93000 ELECTROCARDIOGRAM COMPLETE: CPT

## 2022-11-15 NOTE — HISTORY OF PRESENT ILLNESS
[FreeTextEntry1] : November 19, 2021.  Patient has no known cardiac history.  She thinks she may have had a murmur when she was young but was never sent to a pediatric cardiologist and she was able to compete in track and field without any issues.  She has a lipid profile from 2019 that had a cholesterol of 372, , triglycerides 372, and HDL of 41.  She has been mostly a vegan since then.  Unfortunately there is no known family history as she was adopted.  She does not smoke other than a few cigarettes years ago when she was younger.  No known diabetes or hypertension.  On September 29 she awoke with severe pressure across her chest in the middle of the night more right-sided than left and going a little bit to the right flank.  It lasted about an hour and then resolved.  She did not describe any associated features or radiation and that exact symptom has not recurred.  However she has had exertional shortness of breath which she dates back to August when she and her  had Covid.  She was treated with monoclonal antibodies and seemed to do okay. (Not yet vaccinated because she had to wait 90 days from the monoclonal antibodies and is scheduled to get her first vaccine next week.)  However she says she gets chest pressure on and off ever since the episode and maybe even before. It can last all day and is different than the severe episode that night.  At first she said there was no difference with exertion but by the end she felt she was more likely to have chest pressure and shortness of breath with exertion.  She had a recent repeat lipid profile and now her cholesterol was 389 with an LDL of 313 again despite being on a vegan diet.  Her recent hemoglobin A1c was 5.2.  She has a long history of manic depression and borderline personality disorder but after intensive therapy these have been under good control and she is on no medications for them.  She has had a couple of syncopal episodes that sound like either related to exhaustion and dehydration or possibly vasovagal.  As mentioned she is still premenopausal.  Her second pregnancy was complicated by low blood pressure but she has never had preeclampsia or hypertension related to her pregnancy.  She does get lightheaded if she has not eaten in a while.  Her baseline EKG is sinus bradycardia at 55 and otherwise totally normal.\par 11/22/2021.  Patient returns for brief follow-up along with her stress test.  Her cholesterol profile had already improved significantly on just 10 mg of rosuvastatin with a cholesterol of 253, HDL 56, .  The cardiac C-reactive protein was negative at 0.29.  Homocystine was normal at 8.2.  Her NMR lipid profile however was very abnormal with definitely small dense lipoproteins at 958 which is very high and her insulin resistance was very high at 66 although the average LDL size of 21.5 is favorable.  On the stress test she was able to exercise for 10-1/2 minutes to a heart rate of 168.  She started out with some resting atypical right-sided chest pressure that was 3 out of 10 and increased to 5 out of 10.  She developed some left-sided sharp pinching pain during the test and then in recovery felt lightheaded.  (She had mentioned that over the weekend she went running and felt lightheaded and had the chest pain and had to stop).  There were absolutely no ST changes whatsoever during the test or in recovery and no APCs or VPCs.\par 1/25/2022.  Patient here in follow-up.  Now on rosuvastatin 20 mg.  Still has multiple symptoms none of which I think are related to the rosuvastatin.  Her  mentioned that she forgets a lot of things but I explained that this could not be from the statin such a short period of time.  Blood pressure excellent and improved.  Exam unremarkable.\par January 28, 2022. was here for echo today so came to talk about her with me.  He wanted to know if she would be diagnosed with angina and whether she needs an echocardiogram.  Still having chest pain and is exhausted which she thinks is ever since the rosuvastatin was increased.  I did explain that to have angina even with normal coronaries 1 has to first demonstrate ischemia on stress testing which she has not yet but we will schedule an echo and follow-up. \par  January 31, 2022.Patient came for echocardiogram.  Mild degree of MVP of posterior leaflet with mild MR.  Normal aortic valve.  Normal left atrium.  Normal LV size and function with LVEF 73%.  Normal RV size and function with mild TR.  RVSP 33.  Normal pericardium with no effusion.  Spoke with patient.  Reassured her that the degree of mitral regurgitation now is not causing any symptoms or problems and might not in the future but we will follow the echo every few years. \par August 9, 2022.  Patient returns in follow-up.  EKG is sinus rhythm at 60 and otherwise unremarkable.  She did see the Cardiologenomics program at [facility] for hereditary cardiac predisposition back on April 12 and again on June 21. (Actually recommended by her daughters gastroenterologist as her 14-year-old daughter also has the same pathogenic variant in the LDLR gene. 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  The patient's daughter is on atorvastatin and the patient herself is doing well on rosuvastatin 20 mg.  No muscle aches, no chest pain or shortness of breath etc.  Her blood pressure seems to be running high and there was only 1 normal reading in the computer which seems to be the outlier.  She does have a blood pressure cuff at home and she will begin checking blood pressure a few times a week.\par August 10, 2022.  Left long message for the patient.  Lipids have gone way up again.  First need to make sure she has not gone off her diet and if she has she needs to get very strict.  Meanwhile I am increasing the rosuvastatin to 40 mg and adding Zetia 10 mg.  I did mention to her that if this does not work we might have to consider some of the PCSK9 injections or the newer injections to lower her lipids.  However based on the 2 previous lipid panels I suspect diet may be playing a role.  Her TSH was normal last November so not likely we are dealing with hypothyroidism-but will recheck. \par November 15, 2022.  Patient returns in follow-up.  Her weight is up 4 pounds.  Her EKG is sinus rhythm at 58 and otherwise normal.  She claims she only eats once a day, Pasta and otherwise just vegetables etc.  She is lactose intolerant so very little if any dairy.  I did explain to her the importance of checking labels for things like salad dressing etc. that do have saturated fat.  She is compliant with the medication she claims.  Her weight has gone up a little despite her exercise and her diet.  She is not having any chest pain or any palpitations.\par

## 2022-11-15 NOTE — REVIEW OF SYSTEMS
[Weight Gain (___ Lbs)] : [unfilled] ~Ulb weight gain [Negative] : Heme/Lymph [Feeling Fatigued] : not feeling fatigued [Weight Loss (___ Lbs)] : no recent weight loss [Dyspnea on exertion] : not dyspnea during exertion [Chest Discomfort] : no chest discomfort [Lower Ext Edema] : no extremity edema [Palpitations] : no palpitations [Orthopnea] : no orthopnea [PND] : no PND [Syncope] : no syncope [FreeTextEntry5] : see HPI [FreeTextEntry7] : Celiac disease [de-identified] : Bipolar disorder and borderline personality disorder both under good control without medication

## 2022-11-15 NOTE — PHYSICAL EXAM
[Normal] : well developed, well nourished, no acute distress [Well Developed] : well developed [Well Nourished] : well nourished [No Acute Distress] : no acute distress [Normal Conjunctiva] : normal conjunctiva [Normal Venous Pressure] : normal venous pressure [No Carotid Bruit] : no carotid bruit [Normal S1, S2] : normal S1, S2 [No Murmur] : no murmur [No Rub] : no rub [No Gallop] : no gallop [Clear Lung Fields] : clear lung fields [Good Air Entry] : good air entry [No Respiratory Distress] : no respiratory distress  [Soft] : abdomen soft [Non Tender] : non-tender [No Masses/organomegaly] : no masses/organomegaly [Normal Bowel Sounds] : normal bowel sounds [Normal Gait] : normal gait [No Edema] : no edema [No Cyanosis] : no cyanosis [No Clubbing] : no clubbing [No Varicosities] : no varicosities [Normal Radial B/L] : normal radial B/L [Normal PT B/L] : normal PT B/L [Normal DP B/L] : normal DP B/L [No Rash] : no rash [No Skin Lesions] : no skin lesions [Moves all extremities] : moves all extremities [No Focal Deficits] : no focal deficits [Normal Speech] : normal speech [Alert and Oriented] : alert and oriented [Normal memory] : normal memory [de-identified] : No gallops rubs murmurs or clicks even in the left lateral decubitus position [de-identified] : No pulse deficits and no blood pressure differential in both arms [de-identified] : Multiple tattoos both arms [de-identified] : Not overly anxious, not manic not depressed

## 2022-11-15 NOTE — CARDIOLOGY SUMMARY
[de-identified] : November 22, 2021.  Patient exercised for 10 minutes and 35 seconds to a heart rate of 168 and a blood pressure of 162/80.  She started out with mild chest discomfort on the right side which increased to 5 out of 10 during exercise and persisted for 5 minutes recovery.  She also have briefly had left-sided chest pain that was sharp and pinching-like during the exercise and then in recovery she said she felt lightheaded.  Blood pressure in recovery was fine.  Absolutely no ST changes and no arrhythmias. [de-identified] : January 31, 2022.  Mild mitral valve prolapse involving posterior leaflet with mild MR.  Normal trileaflet aortic valve with normal opening and no AI.  Aorta 3.7 cm at sinus of Valsalva, ascending aorta 2.9 cm.  Normal left atrium.  Normal LV size and function with LVEF 73%.  Normal diastolic function.  Normal RV size and function with mild TR.  RVSP 33.  Normal pericardium with no effusion.

## 2022-11-16 LAB
ALBUMIN SERPL ELPH-MCNC: 4.9 G/DL
ALP BLD-CCNC: 76 U/L
ALT SERPL-CCNC: 39 U/L
ANION GAP SERPL CALC-SCNC: 15 MMOL/L
AST SERPL-CCNC: 28 U/L
BASOPHILS # BLD AUTO: 0.04 K/UL
BASOPHILS NFR BLD AUTO: 0.5 %
BILIRUB SERPL-MCNC: 0.2 MG/DL
BUN SERPL-MCNC: 13 MG/DL
CALCIUM SERPL-MCNC: 9.9 MG/DL
CHLORIDE SERPL-SCNC: 105 MMOL/L
CHOLEST SERPL-MCNC: 177 MG/DL
CO2 SERPL-SCNC: 21 MMOL/L
CREAT SERPL-MCNC: 0.6 MG/DL
EGFR: 118 ML/MIN/1.73M2
EOSINOPHIL # BLD AUTO: 0.03 K/UL
EOSINOPHIL NFR BLD AUTO: 0.4 %
ESTIMATED AVERAGE GLUCOSE: 100 MG/DL
GLUCOSE SERPL-MCNC: 86 MG/DL
HBA1C MFR BLD HPLC: 5.1 %
HCT VFR BLD CALC: 44 %
HDLC SERPL-MCNC: 47 MG/DL
HGB BLD-MCNC: 13.9 G/DL
IMM GRANULOCYTES NFR BLD AUTO: 0.1 %
LDLC SERPL CALC-MCNC: 110 MG/DL
LYMPHOCYTES # BLD AUTO: 1.89 K/UL
LYMPHOCYTES NFR BLD AUTO: 24.7 %
MAN DIFF?: NORMAL
MCHC RBC-ENTMCNC: 31.6 GM/DL
MCHC RBC-ENTMCNC: 31.6 PG
MCV RBC AUTO: 100 FL
MONOCYTES # BLD AUTO: 0.48 K/UL
MONOCYTES NFR BLD AUTO: 6.3 %
NEUTROPHILS # BLD AUTO: 5.21 K/UL
NEUTROPHILS NFR BLD AUTO: 68 %
NONHDLC SERPL-MCNC: 129 MG/DL
PLATELET # BLD AUTO: 346 K/UL
POTASSIUM SERPL-SCNC: 4.8 MMOL/L
PROT SERPL-MCNC: 7.4 G/DL
RBC # BLD: 4.4 M/UL
RBC # FLD: 11.1 %
SODIUM SERPL-SCNC: 141 MMOL/L
TRIGL SERPL-MCNC: 98 MG/DL
TSH SERPL-ACNC: 1.32 UIU/ML
WBC # FLD AUTO: 7.66 K/UL

## 2023-02-06 DIAGNOSIS — I34.0 NONRHEUMATIC MITRAL (VALVE) INSUFFICIENCY: ICD-10-CM

## 2023-02-07 ENCOUNTER — APPOINTMENT (OUTPATIENT)
Dept: CARDIOLOGY | Facility: CLINIC | Age: 38
End: 2023-02-07

## 2023-03-02 RX ORDER — EZETIMIBE 10 MG/1
10 TABLET ORAL
Qty: 90 | Refills: 0 | Status: ACTIVE | COMMUNITY
Start: 2022-08-10 | End: 1900-01-01

## 2023-03-02 RX ORDER — ROSUVASTATIN CALCIUM 40 MG/1
40 TABLET, FILM COATED ORAL
Qty: 90 | Refills: 0 | Status: ACTIVE | COMMUNITY
Start: 2021-10-05 | End: 1900-01-01

## 2023-05-09 ENCOUNTER — APPOINTMENT (OUTPATIENT)
Dept: CARDIOLOGY | Facility: CLINIC | Age: 38
End: 2023-05-09